# Patient Record
Sex: FEMALE | Race: ASIAN | Employment: STUDENT | URBAN - METROPOLITAN AREA
[De-identification: names, ages, dates, MRNs, and addresses within clinical notes are randomized per-mention and may not be internally consistent; named-entity substitution may affect disease eponyms.]

---
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
## 2023-05-31 ENCOUNTER — OFFICE VISIT (OUTPATIENT)
Dept: INTERNAL MEDICINE CLINIC | Facility: CLINIC | Age: 23
End: 2023-05-31

## 2023-05-31 VITALS
OXYGEN SATURATION: 99 % | SYSTOLIC BLOOD PRESSURE: 116 MMHG | HEART RATE: 101 BPM | DIASTOLIC BLOOD PRESSURE: 72 MMHG | HEIGHT: 64.5 IN | BODY MASS INDEX: 19.9 KG/M2 | WEIGHT: 118 LBS

## 2023-05-31 DIAGNOSIS — Z00.00 ADULT GENERAL MEDICAL EXAM: Primary | ICD-10-CM

## 2023-05-31 PROCEDURE — 3078F DIAST BP <80 MM HG: CPT | Performed by: INTERNAL MEDICINE

## 2023-05-31 PROCEDURE — 3074F SYST BP LT 130 MM HG: CPT | Performed by: INTERNAL MEDICINE

## 2023-05-31 PROCEDURE — 99385 PREV VISIT NEW AGE 18-39: CPT | Performed by: INTERNAL MEDICINE

## 2023-05-31 PROCEDURE — 3008F BODY MASS INDEX DOCD: CPT | Performed by: INTERNAL MEDICINE

## 2023-06-02 ENCOUNTER — TELEPHONE (OUTPATIENT)
Facility: CLINIC | Age: 23
End: 2023-06-02

## 2025-06-23 ENCOUNTER — TELEPHONE (OUTPATIENT)
Dept: INTERNAL MEDICINE CLINIC | Facility: CLINIC | Age: 25
End: 2025-06-23

## (undated) NOTE — LETTER
6/2/2023              Yohannes Graf        [address] [place] [address] [address]        [place] [address]         To whom it may concern    Yohannes Graf underwent tuberculin skin testing on May 31, 2023. No induration noted on June 2, 2023. Test was conducted with [facility]. If you have any questions, please contact my office.   Thank you      Sincerely,    Cassie Jeong MD  [facility], [address] [place] [address]  [place] [address] [place] [address]  [address] [facility] [address] [phone]        Document electronically generated by:  Cassie Jeong MD

## (undated) NOTE — LETTER
6/2/2023              Yohannes Graf        [address] [place] [address]         Dear Alicia Sanabria,    It was a pleasure to see you. Your TB Skin test was negative no induration. There is no need for further testing at this time. I look forward to seeing you at your next scheduled appointment.       Sincerely,    Mary Queen MD  [address] [phone]

## (undated) NOTE — LETTER
State of [facility] 57 Examination       Student's Name  Antoniette Hammans, Cathie Deacon A Birth [birth year]ym[place] Title                           Date  [birth year]     Signature                                                                                                                                              Title HEALTH HISTORY          TO BE COMPLETED AND SIGNED BY PARENT/GUARDIAN AND VERIFIED BY HEALTH CARE PROVIDER    ALLERGIES  (Food, drug, insect, other) MEDICATION  (List all prescribed or taken on a regular basis.)     Diagnosis of asthma?   Child wakes during BMI 23.03 kg/m²     DIABETES SCREENING  BMI>85% age/sex  No And any two of the following:  Family History No   Ethnic Minority  No          Signs of Insulin Resistance (hypertension, dyslipidemia, polycystic ovarian syndrome, acanthosis nigricans)    No Quick-relief  medication (e.g. Short Acting Beta Antagonist): No          Controller medication (e.g. inhaled corticosteroid):   No Other   NEEDS/MODIFICATIONS required in the school setting  None DIETARY Needs/Restrictions     None   SPECIAL INSTR

## (undated) NOTE — LETTER
6/2/2023              Yohannes Graf        [address] [place] [address]         Dear Chuy Manzo,    It was a pleasure to see you. Your TB Skin test was normal.  There is no need for further testing at this time. I look forward to seeing you at your next scheduled appointment.       Sincerely,    Satish Saxena MD  [address]  [phone]              Document electronically generated by:  Tiffany Martinez

## (undated) NOTE — LETTER
6/2/2023              Yohannes Graf        [address] [place] [address]         Dear Edouard Duckworth,    It was a pleasure to see you. Your TB Skin test was negative no was normal.  There is no need for further testing at this time. I look forward to seeing you at your next scheduled appointment.       Sincerely,    Fang Barnes MD  [address]  [phone]              Document electronically generated by:  Zaki Ahuja

## (undated) NOTE — LETTER
VACCINE ADMINISTRATION RECORD  PARENT / GUARDIAN APPROVAL  Date: 2019  Vaccine administered to: Yohannes Graf     : 2000    MRN: [id number]    A copy of the appropriate Centers for Disease Control and Prevention Vaccine Information statement ha

## (undated) NOTE — LETTER
State of [facility] 57 Examination       Student's Name  Nasima Ambrose A Birth [place]ym[place] Title                           Date     Signature HEALTH HISTORY          TO BE COMPLETED AND SIGNED BY PARENT/GUARDIAN AND VERIFIED BY HEALTH CARE PROVIDER    ALLERGIES  (Food, drug, insect, other)  Patient has no known allergies.  MEDICATION  (List all prescribed or taken on a regular basis.)  No current /83   Pulse 81   Ht 5' 5\" (1.651 m)   Wt 62.8 kg (138 lb 6.4 oz)   BMI 23.03 kg/m²     DIABETES SCREENING  BMI>85% age/sex  No And any two of the following:  Family History No    Ethnic Minority  No          Signs of Insulin Resistance (hypertension Currently Prescribed Asthma Medication:            Quick-relief  medication (e.g. Short Acting Beta Antagonist): No          Controller medication (e.g. inhaled corticosteroid):   No Other   NEEDS/MODIFICATIONS required in the school setting  None DIET